# Patient Record
Sex: MALE | Race: WHITE
[De-identification: names, ages, dates, MRNs, and addresses within clinical notes are randomized per-mention and may not be internally consistent; named-entity substitution may affect disease eponyms.]

---

## 2021-02-26 ENCOUNTER — HOSPITAL ENCOUNTER (EMERGENCY)
Dept: HOSPITAL 1 - ED | Age: 41
Discharge: HOME | End: 2021-02-26
Payer: MEDICAID

## 2021-02-26 VITALS — SYSTOLIC BLOOD PRESSURE: 125 MMHG | DIASTOLIC BLOOD PRESSURE: 66 MMHG

## 2021-02-26 VITALS
BODY MASS INDEX: 24.64 KG/M2 | HEIGHT: 67 IN | BODY MASS INDEX: 24.64 KG/M2 | WEIGHT: 157 LBS | HEIGHT: 67 IN | WEIGHT: 157 LBS

## 2021-02-26 DIAGNOSIS — M54.5: Primary | ICD-10-CM

## 2021-02-26 LAB
ALBUMIN SERPL-MCNC: 4 G/DL (ref 3.4–5)
ALP SERPL-CCNC: 79 U/L (ref 46–116)
ALT SERPL-CCNC: 41 U/L (ref 16–63)
AST SERPL-CCNC: 34 U/L (ref 15–37)
BASOPHILS NFR BLD: 0.6 % (ref 0.2–1.5)
BILIRUB SERPL-MCNC: 0.42 MG/DL (ref 0.2–1)
BUN SERPL-MCNC: 12 MG/DL (ref 7–18)
CALCIUM SERPL-MCNC: 9 MG/DL (ref 8.5–10.1)
CHLORIDE SERPL-SCNC: 105 MMOL/L (ref 98–107)
CHOLEST SERPL-MCNC: 140 MG/DL (ref ?–200)
CHOLEST/HDLC SERPL: 4.2 MG/DL
CO2 SERPL-SCNC: 26.4 MMOL/L (ref 21–32)
CREAT SERPL-MCNC: 1 MG/DL (ref 0.7–1.3)
ERYTHROCYTE [DISTWIDTH] IN BLOOD BY AUTOMATED COUNT: 13.2 % (ref 12.1–16.2)
GFR SERPLBLD BASED ON 1.73 SQ M-ARVRAT: > 60 ML/MIN
GLUCOSE SERPL-MCNC: 53 MG/DL (ref 74–106)
HDLC SERPL-MCNC: 33 MG/DL (ref 40–60)
LIPASE SERPL-CCNC: 146 IU/L (ref 73–393)
MICROSCOPIC UR-IMP: NO
PLATELET # BLD: 214 X10^3MCL (ref 152–348)
POTASSIUM SERPL-SCNC: 4.1 MMOL/L (ref 3.5–5.1)
PROT SERPL-MCNC: 7.4 G/DL (ref 6.4–8.2)
RBC # UR STRIP.AUTO: NEGATIVE /UL
SODIUM SERPL-SCNC: 139 MMOL/L (ref 136–145)
TRIGL SERPL-MCNC: 165 MG/DL (ref ?–150)
UA SPECIFIC GRAVITY: 1.01 (ref 1–1.03)

## 2021-09-05 ENCOUNTER — HOSPITAL ENCOUNTER (INPATIENT)
Dept: HOSPITAL 26 - MED | Age: 41
LOS: 9 days | Discharge: LEFT BEFORE DISCHARGE | DRG: 720 | End: 2021-09-14
Attending: INTERNAL MEDICINE | Admitting: INTERNAL MEDICINE
Payer: COMMERCIAL

## 2021-09-05 VITALS — SYSTOLIC BLOOD PRESSURE: 106 MMHG | DIASTOLIC BLOOD PRESSURE: 63 MMHG

## 2021-09-05 VITALS — DIASTOLIC BLOOD PRESSURE: 67 MMHG | SYSTOLIC BLOOD PRESSURE: 127 MMHG

## 2021-09-05 VITALS — SYSTOLIC BLOOD PRESSURE: 121 MMHG | DIASTOLIC BLOOD PRESSURE: 66 MMHG

## 2021-09-05 VITALS — WEIGHT: 150 LBS | BODY MASS INDEX: 23.54 KG/M2 | HEIGHT: 67 IN

## 2021-09-05 DIAGNOSIS — R74.01: ICD-10-CM

## 2021-09-05 DIAGNOSIS — Z82.49: ICD-10-CM

## 2021-09-05 DIAGNOSIS — D68.59: ICD-10-CM

## 2021-09-05 DIAGNOSIS — U07.1: ICD-10-CM

## 2021-09-05 DIAGNOSIS — J96.01: ICD-10-CM

## 2021-09-05 DIAGNOSIS — A41.9: Primary | ICD-10-CM

## 2021-09-05 DIAGNOSIS — J12.82: ICD-10-CM

## 2021-09-05 LAB
ALBUMIN FLD-MCNC: 3.2 G/DL (ref 3.4–5)
ALBUMIN FLD-MCNC: 3.3 G/DL (ref 3.4–5)
ANION GAP SERPL CALCULATED.3IONS-SCNC: 10.5 MMOL/L (ref 8–16)
AST SERPL-CCNC: 47 U/L (ref 15–37)
AST SERPL-CCNC: 48 U/L (ref 15–37)
BASOPHILS # BLD AUTO: 0 K/UL (ref 0–0.22)
BASOPHILS NFR BLD AUTO: 0.2 % (ref 0–2)
BILIRUB DIRECT SERPL-MCNC: 0.1 MG/DL (ref 0–0.3)
BILIRUB SERPL-MCNC: 0.3 MG/DL (ref 0–1)
BILIRUB SERPL-MCNC: 0.3 MG/DL (ref 0–1)
BUN SERPL-MCNC: 6 MG/DL (ref 7–18)
CHLORIDE SERPL-SCNC: 102 MMOL/L (ref 98–107)
CO2 SERPL-SCNC: 29.1 MMOL/L (ref 21–32)
CREAT SERPL-MCNC: 1 MG/DL (ref 0.6–1.3)
DEPRECATED D DIMER PPP-ACNC: 166 NG/ML (ref 0–400)
EOSINOPHIL # BLD AUTO: 0 K/UL (ref 0–0.4)
EOSINOPHIL NFR BLD AUTO: 0.2 % (ref 0–4)
ERYTHROCYTE [DISTWIDTH] IN BLOOD BY AUTOMATED COUNT: 13.3 % (ref 11.6–13.7)
FIBRINOGEN PPP-MCNC: 438 MG/DL (ref 200–400)
GFR SERPL CREATININE-BSD FRML MDRD: 105 ML/MIN (ref 90–?)
GLUCOSE SERPL-MCNC: 124 MG/DL (ref 74–106)
HCT VFR BLD AUTO: 41 % (ref 36–52)
HGB BLD-MCNC: 13.8 G/DL (ref 12–18)
LDH SERPL-CCNC: 219 U/L (ref 85–227)
LYMPHOCYTES # BLD AUTO: 0.6 K/UL (ref 2–11.5)
LYMPHOCYTES NFR BLD AUTO: 18.8 % (ref 20.5–51.1)
MCH RBC QN AUTO: 30 PG (ref 27–31)
MCHC RBC AUTO-ENTMCNC: 34 G/DL (ref 33–37)
MCV RBC AUTO: 89.4 FL (ref 80–94)
MONOCYTES # BLD AUTO: 0.1 K/UL (ref 0.8–1)
MONOCYTES NFR BLD AUTO: 3.5 % (ref 1.7–9.3)
NEUTROPHILS # BLD AUTO: 2.6 K/UL (ref 1.8–7.7)
NEUTROPHILS NFR BLD AUTO: 77.3 % (ref 42.2–75.2)
PLATELET # BLD AUTO: 109 K/UL (ref 140–450)
POTASSIUM SERPL-SCNC: 3.6 MMOL/L (ref 3.5–5.1)
RBC # BLD AUTO: 4.59 MIL/UL (ref 4.2–6.1)
SODIUM SERPL-SCNC: 138 MMOL/L (ref 136–145)
WBC # BLD AUTO: 3.3 K/UL (ref 4.8–10.8)

## 2021-09-05 PROCEDURE — U0003 INFECTIOUS AGENT DETECTION BY NUCLEIC ACID (DNA OR RNA); SEVERE ACUTE RESPIRATORY SYNDROME CORONAVIRUS 2 (SARS-COV-2) (CORONAVIRUS DISEASE [COVID-19]), AMPLIFIED PROBE TECHNIQUE, MAKING USE OF HIGH THROUGHPUT TECHNOLOGIES AS DESCRIBED BY CMS-2020-01-R: HCPCS

## 2021-09-05 RX ADMIN — DEXTROSE SCH MLS/HR: 50 INJECTION, SOLUTION INTRAVENOUS at 19:24

## 2021-09-05 RX ADMIN — DEXAMETHASONE SODIUM PHOSPHATE SCH MG: 4 INJECTION, SOLUTION INTRAMUSCULAR; INTRAVENOUS at 18:25

## 2021-09-05 RX ADMIN — APIXABAN SCH MG: 2.5 TABLET, FILM COATED ORAL at 20:54

## 2021-09-05 NOTE — NUR
40/m bib self C/O COUGH,HA, SORE THROT,FEVER X 1.5 WEEK  AND  C/O SOB X 3 DAYS. 
COVID TESTED + 8/28/21. SEEN AT [facility] FOR COVID 19 & PNEUMONIA. O2SAT 93% AT 
THIS TIME.

PMH: DENIES. DENIES N/V/D; SKIN IS PINK/WARM/DRY; AAOX4 WITH EVEN AND STEADY 
GAIT. PATIENT STATES PAIN OF 10/10 AT THIS TIME. PATIENT POSITIONED FOR 
COMFORT; HOB ELEVATED; BEDRAILS UP X1; BED DOWN. ER MD MADE AWARE OF PT STATUS.

## 2021-09-05 NOTE — NUR
PT IN ROOM REQUESTING TO USE RESTROOM, FOUND TO HAVE CANNULA OFF. REINFORCED IMPORTANCE OF 
SUPP OXYGEN. PT AGREEABLE. WILL CONTINUE TO OBSERVE

## 2021-09-05 NOTE — NUR
RECEIVED PATIENT FROM ER NURSE VIA ALICIA. PATIENT ADMITTED FOR COVID AND PNA. PT IS AOX4, 
ABLE TO MAKE NEEDS KNOWN. RESPIRATIONS EVEN AND UNLABORED. NO S/S OF RESPIRATORY DISTRESS. 
SKIN IS WARM, DRY, AND INTACT. IV SITE ON GAL 20G SALINE LOCKED. INTACT AND PATENT. ABD 
SOFT, FLAT AND NON-DISTENDED. BOWEL SOUNDS ACTIVE IN ALL 4 QUADRANTS. DENIES PAIN AT THE 
MOMENT. PLAN OF CARE DISCUSSED. SAFETY PRECAUTIONS IN PLACE. CALL LIGHT WITHIN REACH. WILL 
CONTINUE TO MONITOR.

## 2021-09-05 NOTE — NUR
ASSISTED PT TO BATHROOM, PT HELPED BACK TO BED VOIDED X1, PT STATED HE FELT SOB VS CHECKED, 
O2 78 % ON ROOM AIR, NASAL CANNULA PLACED BACK ON PT 2L PT 84-86%. INCREASED TO 4L PT NOW 
91%. WILL CONTINUE TO OBSERVE.

## 2021-09-05 NOTE — NUR
RECEIVED REPORT FROM DAY SHIFT RN; PT AWAKE ALERT ORIENTED X3 FOLLOWING COMMANDS. PT AMB IN 
ROOM, MILD WEAKNESS NOTED. LUNGS DIMINISHED; SOB WITH EXERTION. PT FOUND TO HAVE CANNULA 
OFF, REINSTRUCTED TO PLACE NASAL CANNULA AND USE DEEP BREATHING TECHNIQUES. ABD SOFT NON 
DISTENDED. TOLERATING PO INTAKE. PT VOIDING IN RESTROOM. IV TO RUPPER ARM NOTED 20 G. BED 
LOCKED IN LOWEST POSITION. CALL LIGHT WITHIN REACH.

## 2021-09-05 NOTE — NUR
Patient will be admitted to care of Dr. Merrill. Admited to TELE.  Will go to 
room 118. Belongings list completed.  Report to Ayan

## 2021-09-06 VITALS — SYSTOLIC BLOOD PRESSURE: 135 MMHG | DIASTOLIC BLOOD PRESSURE: 67 MMHG

## 2021-09-06 VITALS — SYSTOLIC BLOOD PRESSURE: 116 MMHG | DIASTOLIC BLOOD PRESSURE: 73 MMHG

## 2021-09-06 VITALS — DIASTOLIC BLOOD PRESSURE: 74 MMHG | SYSTOLIC BLOOD PRESSURE: 134 MMHG

## 2021-09-06 VITALS — DIASTOLIC BLOOD PRESSURE: 65 MMHG | SYSTOLIC BLOOD PRESSURE: 118 MMHG

## 2021-09-06 VITALS — DIASTOLIC BLOOD PRESSURE: 56 MMHG | SYSTOLIC BLOOD PRESSURE: 159 MMHG

## 2021-09-06 VITALS — DIASTOLIC BLOOD PRESSURE: 71 MMHG | SYSTOLIC BLOOD PRESSURE: 121 MMHG

## 2021-09-06 LAB
ALBUMIN FLD-MCNC: 3.2 G/DL (ref 3.4–5)
ALBUMIN FLD-MCNC: 3.2 G/DL (ref 3.4–5)
ANION GAP SERPL CALCULATED.3IONS-SCNC: 13.3 MMOL/L (ref 8–16)
AST SERPL-CCNC: 49 U/L (ref 15–37)
AST SERPL-CCNC: 51 U/L (ref 15–37)
BASOPHILS # BLD AUTO: 0 K/UL (ref 0–0.22)
BASOPHILS NFR BLD AUTO: 0.2 % (ref 0–2)
BILIRUB DIRECT SERPL-MCNC: 0.1 MG/DL (ref 0–0.3)
BILIRUB SERPL-MCNC: 0.2 MG/DL (ref 0–1)
BILIRUB SERPL-MCNC: 0.2 MG/DL (ref 0–1)
BUN SERPL-MCNC: 13 MG/DL (ref 7–18)
CHLORIDE SERPL-SCNC: 103 MMOL/L (ref 98–107)
CO2 SERPL-SCNC: 26.7 MMOL/L (ref 21–32)
CREAT SERPL-MCNC: 0.9 MG/DL (ref 0.6–1.3)
EOSINOPHIL # BLD AUTO: 0 K/UL (ref 0–0.4)
EOSINOPHIL NFR BLD AUTO: 0 % (ref 0–4)
ERYTHROCYTE [DISTWIDTH] IN BLOOD BY AUTOMATED COUNT: 13.2 % (ref 11.6–13.7)
GFR SERPL CREATININE-BSD FRML MDRD: 119 ML/MIN (ref 90–?)
GLUCOSE SERPL-MCNC: 176 MG/DL (ref 74–106)
HCT VFR BLD AUTO: 40.7 % (ref 36–52)
HGB BLD-MCNC: 13.8 G/DL (ref 12–18)
LYMPHOCYTES # BLD AUTO: 0.4 K/UL (ref 2–11.5)
LYMPHOCYTES NFR BLD AUTO: 9.5 % (ref 20.5–51.1)
MAGNESIUM SERPL-MCNC: 2.2 MG/DL (ref 1.8–2.4)
MCH RBC QN AUTO: 30 PG (ref 27–31)
MCHC RBC AUTO-ENTMCNC: 34 G/DL (ref 33–37)
MCV RBC AUTO: 89 FL (ref 80–94)
MONOCYTES # BLD AUTO: 0.1 K/UL (ref 0.8–1)
MONOCYTES NFR BLD AUTO: 2.3 % (ref 1.7–9.3)
NEUTROPHILS # BLD AUTO: 4 K/UL (ref 1.8–7.7)
NEUTROPHILS NFR BLD AUTO: 88 % (ref 42.2–75.2)
PLATELET # BLD AUTO: 131 K/UL (ref 140–450)
POTASSIUM SERPL-SCNC: 4 MMOL/L (ref 3.5–5.1)
RBC # BLD AUTO: 4.57 MIL/UL (ref 4.2–6.1)
SODIUM SERPL-SCNC: 139 MMOL/L (ref 136–145)
WBC # BLD AUTO: 4.6 K/UL (ref 4.8–10.8)

## 2021-09-06 PROCEDURE — XW033E5 INTRODUCTION OF REMDESIVIR ANTI-INFECTIVE INTO PERIPHERAL VEIN, PERCUTANEOUS APPROACH, NEW TECHNOLOGY GROUP 5: ICD-10-PCS | Performed by: INTERNAL MEDICINE

## 2021-09-06 RX ADMIN — APIXABAN SCH MG: 2.5 TABLET, FILM COATED ORAL at 21:00

## 2021-09-06 RX ADMIN — DEXTROSE SCH MLS/HR: 50 INJECTION, SOLUTION INTRAVENOUS at 18:21

## 2021-09-06 RX ADMIN — TEMAZEPAM SCH MG: 15 CAPSULE ORAL at 22:03

## 2021-09-06 RX ADMIN — DEXAMETHASONE SODIUM PHOSPHATE SCH MG: 4 INJECTION, SOLUTION INTRAMUSCULAR; INTRAVENOUS at 17:36

## 2021-09-06 RX ADMIN — APIXABAN SCH MG: 2.5 TABLET, FILM COATED ORAL at 09:53

## 2021-09-06 NOTE — NUR
PLACED PATIENT ON O2 10L VIA OXIMIZER. O2SAT 91-92%. RT AWARE. FREQUENT ROUNDS BY ALL STAFF. 
CALL LIGHT IN REACH.  ISOLATION PRECAUTIONS OBSERVED. SAFETY PRECAUTIONS IN PLACE.

## 2021-09-06 NOTE — NUR
PT WILL BE ENDORSED TO NIGHT SHIFT NURSE FOR CONTINUITY OF CARE IN STABLE CONDITION, POC 
DISCUSSED.

## 2021-09-06 NOTE — NUR
RECEIVED REPORT FROM NIGHT SHIFT NURSE FOR CONTINUITY OF CARE, POC DISCUSSED. PT IS RESTING 
IN BED WITH NO ACUTE S/S WITH 4L O2 NC ON. PT DENIES PAIN AT THIS TIME. PT IS IS ALERT AND 
ORIENTED X4. PT CHEST RISING AND FALLING EVEN AND UNLABORED WITH NO S/S OF SOB. PT HAS A 
RIGHT AC 20G RUNNING TKO. ALL SAFETY MEASURES IN PLACE. CALL LIGHT WITHIN REACH. WILL 
CONTINUE TO MONITOR.

## 2021-09-06 NOTE — NUR
RECEIVED PATIENT FROM AM SHIFT NURSE FOR CONTINUITY OF CARE. ALERT, ABLE TO MAKE NEEDS 
KNOWN. RESPIRATIONS EVEN, SLIGHTLY LABORED UPON EXERTION. NO S/S RESPIRATORY DISTRESS. 
CONTINUES ON O2 6L VIA NC, O2 SAT 93%. S1/S2 AUSCULTATED. TELE MONITORING. DENIES PAIN. SKIN 
WARM, DRY. IV SITE TO RIGHT AC 20G PATENT/INTACT, INFUSING FLUIDS WELL. ABDOMEN SOFT, 
NONTENDER, NONDISTENDED. BOWEL SOUNDS ACTIVE x4 QUADRANTS. PATIENT IS CONTINENT OF B/B. CALL 
LIGHT IN REACH. PLAN OF CARE DISCUSSED. ISOLATION PRECAUTIONS OBSERVED. SAFETY PRECAUTIONS 
IN PLACE.

## 2021-09-06 NOTE — NUR
NAKIA MEDICATION ADMINISTERED PER MD ORDER. PT EDUCATION PROVIDED. PT IS ON 5L NC SATING AT 
94%. PT EDUCATION PROVIDED ON IMPORTANCE OF KEEPING OXYGEN ON. PT REPORTS SOB UPON 
AMBULATING. PT REPORTS ALL NEEDS ARE MET AT THIS TIME. PT SAFETY MEASURES IN PLACE, CALL 
LIGHT WITHIN REACH. WILL CONTINUE TO MONITOR.

## 2021-09-06 NOTE — NUR
REINFORCED NEED FOR SUPPLEMENTAL OXYGEN, PT FORGETS, AND TAKES OFF CANNULA.  PT AGREEABLE TO 
KEEP IT ON, WILL CONTINUE TO OBSERVE

## 2021-09-06 NOTE — NUR
NAKIA MEDICATION ADMINISTERED PER MD ORDER. PT TOLERATED ADMINISTRATION. ALL SAFETY MEASURES 
IN PLACE. CALL LIGHT WITHIN REACH. WILL CONTINUE TO MONITOR.

## 2021-09-06 NOTE — NUR
PATIENT HAS BEEN SCREENED AND CATEGORIZED AS MODERATE NUTRITION RISK. PATIENT WILL BE SEEN 
WITHIN 3-5 DAYS OF ADMISSION.



09/08/21 09/10/21



[address]TIS [address]ILLS [address]

## 2021-09-06 NOTE — NUR
SCD MEDICATION ADMINISTERED PER MD ORDER. PT TOLERATED ADMINISTRATION. ALL SAFETY MEASURES 
IN PLACE. CALL LIGHT WITHIN REACH. WILL CONTINUE TO MONITOR

## 2021-09-06 NOTE — NUR
DUE MEDS GIVEN. NO S/S RESPIRATORY DISTRESS. O2SAT 93%. DENIES PAIN. PATIENT IS CLEAN/DRY. 
CALL LIGHT IN REACH. ISOLATION PRECAUTIONS OBSERVED. SAFETY PRECAUTIONS IN PLACE.

## 2021-09-06 NOTE — NUR
PROVIDED PT WITH A BEDSIDE COMMODE TO DECREASE DISTANCE PT HAS TO AMBULATE TO USE THE 
RESTROOM. EVEN WITH O2 EXTENDER, BEDSIDE COMMODE IS MORE INDICATED TO DECREASE SOB.

## 2021-09-06 NOTE — NUR
PT HAS BEEN TRANSFERRED FROM ROOM 118 TO ROOM 130.  118 TO HOT AND UNABLE TO COOL ROOM DOWN. 
PT WAS TRANSFERRED IN A WHEELCHAIR AND PORTABLE OXYGEN TANK. PT WAS PLACED BACK ON 6L NC. PT 
REPORTED CONTINUOUS COUGHING AND SOB DURING TRANSFER BUT REMAINED STABLE. PT CONTINUED TO 
WEAR FACEMASK THROUGH TRANSFER. PT WAS PLACED IN A NEW ISOLATION ROOM. PT BACK IN BED AND 
STABLE, BACK ON 6L NC WITH IV FLUIDS RUNNING. ALL BELONGINGS TRANSFERRED WITH PATIENT. PT O2 
SATURATION BACK TO 92%. BEDSIDE COMMODE PLACED NEXT TO PT, PT PROVIDED WITH FRESH WATER AND 
WIPES. PT IS STABLE. ALL SAFETY MEASURES IN PLACE. CALL LIGHT WITHIN REACH. WILL CONTINUE TO 
MONITOR.

## 2021-09-07 VITALS — SYSTOLIC BLOOD PRESSURE: 124 MMHG | DIASTOLIC BLOOD PRESSURE: 73 MMHG

## 2021-09-07 VITALS — SYSTOLIC BLOOD PRESSURE: 121 MMHG | DIASTOLIC BLOOD PRESSURE: 53 MMHG

## 2021-09-07 VITALS — SYSTOLIC BLOOD PRESSURE: 123 MMHG | DIASTOLIC BLOOD PRESSURE: 77 MMHG

## 2021-09-07 VITALS — SYSTOLIC BLOOD PRESSURE: 131 MMHG | DIASTOLIC BLOOD PRESSURE: 65 MMHG

## 2021-09-07 VITALS — SYSTOLIC BLOOD PRESSURE: 93 MMHG | DIASTOLIC BLOOD PRESSURE: 59 MMHG

## 2021-09-07 VITALS — DIASTOLIC BLOOD PRESSURE: 69 MMHG | SYSTOLIC BLOOD PRESSURE: 145 MMHG

## 2021-09-07 LAB
ALBUMIN FLD-MCNC: 3.1 G/DL (ref 3.4–5)
AST SERPL-CCNC: 43 U/L (ref 15–37)
BILIRUB DIRECT SERPL-MCNC: 0.1 MG/DL (ref 0–0.3)
BILIRUB SERPL-MCNC: 0.2 MG/DL (ref 0–1)

## 2021-09-07 PROCEDURE — 5A0935A ASSISTANCE WITH RESPIRATORY VENTILATION, LESS THAN 24 CONSECUTIVE HOURS, HIGH NASAL FLOW/VELOCITY: ICD-10-PCS | Performed by: INTERNAL MEDICINE

## 2021-09-07 RX ADMIN — SODIUM CHLORIDE SCH MLS/HR: 9 INJECTION, SOLUTION INTRAVENOUS at 12:52

## 2021-09-07 RX ADMIN — APIXABAN SCH MG: 2.5 TABLET, FILM COATED ORAL at 08:52

## 2021-09-07 RX ADMIN — APIXABAN SCH MG: 2.5 TABLET, FILM COATED ORAL at 20:33

## 2021-09-07 RX ADMIN — TEMAZEPAM SCH MG: 15 CAPSULE ORAL at 22:17

## 2021-09-07 RX ADMIN — DEXAMETHASONE SODIUM PHOSPHATE SCH MG: 4 INJECTION, SOLUTION INTRAMUSCULAR; INTRAVENOUS at 16:53

## 2021-09-07 NOTE — NUR
WITH COUGHING EPISODE AND DESATURATION TO 83% ON HIFLOW 30L FIO2 80%. HOB ELEVATED  TO HIGH 
FOWLERS. INSTRUCTED PT TO RELAX AND BREATH THROUGH HIS NOSE. CALLED RT, INCREASED FLOW TO 
35L AND FIO2 %.

## 2021-09-07 NOTE — NUR
MADE ROUNDS. PATIENT IS ASLEEP. NO S/S RESPIRATORY DISTRESS. PATIENT IS CLEAN/DRY. CALL 
LIGHT IN REACH.  ISOLATION PRECAUTIONS OBSERVED. SAFETY PRECAUTIONS IN PLACE.

## 2021-09-07 NOTE — NUR
PLACED ON A VAPOTHERM HIGH FLOW NASAL CANNULA AS NOTED PLUGGED INTO RED OUTLET TOLERATING 
WELL WITHOUT COMPLICATIONS NOTED MARILU/RN NOTIFIED WITH EDUCATION

## 2021-09-07 NOTE — NUR
MARCO FROM LAB CALLED; PCR CAME BACK POSITIVE. NOT REPORTED TO MD DUE TO PT RAPID POSITIVE 
AND ALL COVID PRECAUTIONS AND PROTOCOL IS IN PLACE.

## 2021-09-07 NOTE — NUR
DC PLANNIN YRS OLD MALE PATIENT WAS ADMITTED FROM HOME WITH A DX OF COVID PNEUMONIA. PATIENT HAS NO 
MEDICAL  HX.CXR SHOWED PATCHY CONSOLIDATIONS CONCERNING FOR COVID PNEUMONIA. RAPID AND PCR 
COVID TEST POSITIVE. ON 15L NRB SATING 90% . ADMINISTERED REMDESIVIR IV AZITHROMYCIN AND 
ROCEPHIN IV ABX 

-------------------------------------------------------------------------------

Addendum: 21 at 1650 by Judy Mccrary RN

-------------------------------------------------------------------------------

DC PLANNING 

PATIENT IS STILL ON 15LHF, SATING 94%. PULMO AND ID FOLLOWING. DC PLAN TO WEAN OF OXYGEN. CM 
TO FOLLOW

## 2021-09-07 NOTE — NUR
PATIENT IS ASLEEP. NO S/S RESPIRATORY DISTRESS. PATIENT IS CLEAN/DRY. CALL LIGHT IN REACH. 
SAFETY PRECAUTIONS IN PLACE. ISOLATION PRECAUTIONS OBSERVED BY ALL STAFF.

## 2021-09-07 NOTE — NUR
ALL NEEDS ANTICIPATED AND MET. NO S/S RESPIRATORY DISTRESS. NO C/O PAIN. PATIENT IS 
CLEAN/DRY. CALL LIGHT IN REACH. SAFETY PRECAUTIONS IN PLACE. ISOLATION PRECAUTIONS OBSERVED 
BY ALL STAFF.

## 2021-09-07 NOTE — NUR
PT ENDORSED BY NIGHT SHIFT FOR CONTINUITY OF CARE, POC DISCUSSED. PT IS RESTING IN BED. 
EDUCATED ON PRONE POSITION, PT STATES HE FEELS WORSE LIKE THAT. EDUCATED ON IMPORTANCE OF 
THE POSITION, PT STATED HE WILL TRY AFTER BREAKFAST. PT IS ON OXIMETER AT 10L. SPOKE WITH 
RAY NAIK, WILL PLACE PT ON HIGH FLOW. PT IS CURRENTLY SATING AT 90%. PT PLACED ON CONTINUOUS 
O2 MONITOR AND TELE MONITOR. ALL SAFETY MEASURES IN PLACE, CALL LIGHT WITHIN REACH. WILL 
CONTINUE TO MONITOR.

## 2021-09-07 NOTE — NUR
NAKIA MEDICATION ADMINISTERED, BREAKFAST PLACED AT BEDSIDE. PT REPORTS HIGH FLOW MAKING HIM 
LESS SHORT OF BREATH. ALL SAFETY MEASURES IN PLACE, CALL LIGHT WITHIN REACH. WILL CONTINUE 
TO MONITOR.

## 2021-09-07 NOTE — NUR
ASLEEP EASILY AWAKENS GOOD CHEST RISE AIRWAY PATENT NO DISTRESS NOTED BREATH SOUNDS 
DECREASED BILATERAL SINCE 09/05/21 DESCENDING SATURATION TO 89% WITH INCREASED OXYGEN USE TO 
11 LPM VIA OXYMIZER PATIENT CANDIDATE FOR HIGH FLOW NASAL CANNULA

## 2021-09-08 VITALS — SYSTOLIC BLOOD PRESSURE: 113 MMHG | DIASTOLIC BLOOD PRESSURE: 61 MMHG

## 2021-09-08 VITALS — SYSTOLIC BLOOD PRESSURE: 112 MMHG | DIASTOLIC BLOOD PRESSURE: 65 MMHG

## 2021-09-08 VITALS — DIASTOLIC BLOOD PRESSURE: 55 MMHG | SYSTOLIC BLOOD PRESSURE: 130 MMHG

## 2021-09-08 VITALS — DIASTOLIC BLOOD PRESSURE: 72 MMHG | SYSTOLIC BLOOD PRESSURE: 105 MMHG

## 2021-09-08 VITALS — SYSTOLIC BLOOD PRESSURE: 119 MMHG | DIASTOLIC BLOOD PRESSURE: 64 MMHG

## 2021-09-08 VITALS — DIASTOLIC BLOOD PRESSURE: 66 MMHG | SYSTOLIC BLOOD PRESSURE: 121 MMHG

## 2021-09-08 LAB
ALBUMIN FLD-MCNC: 3 G/DL (ref 3.4–5)
ANION GAP SERPL CALCULATED.3IONS-SCNC: 12.7 MMOL/L (ref 8–16)
AST SERPL-CCNC: 146 U/L (ref 15–37)
BILIRUB DIRECT SERPL-MCNC: 0.1 MG/DL (ref 0–0.3)
BILIRUB SERPL-MCNC: 0.3 MG/DL (ref 0–1)
BUN SERPL-MCNC: 20 MG/DL (ref 7–18)
CHLORIDE SERPL-SCNC: 105 MMOL/L (ref 98–107)
CO2 SERPL-SCNC: 26.5 MMOL/L (ref 21–32)
CREAT SERPL-MCNC: 0.7 MG/DL (ref 0.6–1.3)
GFR SERPL CREATININE-BSD FRML MDRD: 159 ML/MIN (ref 90–?)
GLUCOSE SERPL-MCNC: 117 MG/DL (ref 74–106)
POTASSIUM SERPL-SCNC: 4.2 MMOL/L (ref 3.5–5.1)
SODIUM SERPL-SCNC: 140 MMOL/L (ref 136–145)

## 2021-09-08 PROCEDURE — 5A0935A ASSISTANCE WITH RESPIRATORY VENTILATION, LESS THAN 24 CONSECUTIVE HOURS, HIGH NASAL FLOW/VELOCITY: ICD-10-PCS | Performed by: INTERNAL MEDICINE

## 2021-09-08 RX ADMIN — APIXABAN SCH MG: 2.5 TABLET, FILM COATED ORAL at 21:38

## 2021-09-08 RX ADMIN — SODIUM CHLORIDE SCH MLS/HR: 9 INJECTION, SOLUTION INTRAVENOUS at 13:00

## 2021-09-08 RX ADMIN — APIXABAN SCH MG: 2.5 TABLET, FILM COATED ORAL at 09:00

## 2021-09-08 RX ADMIN — DEXAMETHASONE SODIUM PHOSPHATE SCH MG: 4 INJECTION, SOLUTION INTRAMUSCULAR; INTRAVENOUS at 16:45

## 2021-09-08 RX ADMIN — TEMAZEPAM SCH MG: 15 CAPSULE ORAL at 21:39

## 2021-09-08 NOTE — NUR
PT REMAINED STABLE THROUGH SHIFT. ONLY COMPLAINTS WERE ANXIETY, GAVE PT PRN ATIVAN TO HELP 
CONTROL IT. IT HELPS DECREASED ANXIETY.REMDESEVIR WAS GIVEN. ALL NEEDS MET FOR TODAY. GAVE 
REPORT TO PM RN .

## 2021-09-08 NOTE — NUR
RECEIVED REPORT FROM PM RN. PT RESTING IN BED ASLEEP, NO SIGNS OF RESP DISTRESS NOTED. 
PATIENT REMAINS ON 35L HIGH NAREN. LOOKS COMFORTABLE. IV WNL, RUNNING AT KVO. BED AT LOWEST, 
CALL LIGHT AT REACH.

## 2021-09-08 NOTE — NUR
DESATURATION TO 82% PATIENT TOOK OFF HIS HI-FLOW, EDUCATION TO KEEP OXYGEN IN PLACE. BRENT BUSTAMANTE RN

## 2021-09-09 VITALS — SYSTOLIC BLOOD PRESSURE: 108 MMHG | DIASTOLIC BLOOD PRESSURE: 54 MMHG

## 2021-09-09 VITALS — DIASTOLIC BLOOD PRESSURE: 61 MMHG | SYSTOLIC BLOOD PRESSURE: 106 MMHG

## 2021-09-09 VITALS — DIASTOLIC BLOOD PRESSURE: 65 MMHG | SYSTOLIC BLOOD PRESSURE: 117 MMHG

## 2021-09-09 VITALS — SYSTOLIC BLOOD PRESSURE: 105 MMHG | DIASTOLIC BLOOD PRESSURE: 70 MMHG

## 2021-09-09 VITALS — DIASTOLIC BLOOD PRESSURE: 65 MMHG | SYSTOLIC BLOOD PRESSURE: 112 MMHG

## 2021-09-09 VITALS — DIASTOLIC BLOOD PRESSURE: 69 MMHG | SYSTOLIC BLOOD PRESSURE: 124 MMHG

## 2021-09-09 LAB
ALBUMIN FLD-MCNC: 3.2 G/DL (ref 3.4–5)
ANION GAP SERPL CALCULATED.3IONS-SCNC: 14.7 MMOL/L (ref 8–16)
AST SERPL-CCNC: 65 U/L (ref 15–37)
BASOPHILS # BLD AUTO: 0.1 K/UL (ref 0–0.22)
BASOPHILS NFR BLD AUTO: 0.8 % (ref 0–2)
BILIRUB SERPL-MCNC: 0.4 MG/DL (ref 0–1)
BUN SERPL-MCNC: 19 MG/DL (ref 7–18)
CHLORIDE SERPL-SCNC: 103 MMOL/L (ref 98–107)
CO2 SERPL-SCNC: 27.3 MMOL/L (ref 21–32)
CREAT SERPL-MCNC: 0.8 MG/DL (ref 0.6–1.3)
EOSINOPHIL # BLD AUTO: 0 K/UL (ref 0–0.4)
EOSINOPHIL NFR BLD AUTO: 0.1 % (ref 0–4)
ERYTHROCYTE [DISTWIDTH] IN BLOOD BY AUTOMATED COUNT: 13.5 % (ref 11.6–13.7)
GFR SERPL CREATININE-BSD FRML MDRD: 136 ML/MIN (ref 90–?)
GLUCOSE SERPL-MCNC: 109 MG/DL (ref 74–106)
HCT VFR BLD AUTO: 45.3 % (ref 36–52)
HGB BLD-MCNC: 15.4 G/DL (ref 12–18)
LYMPHOCYTES # BLD AUTO: 1.3 K/UL (ref 2–11.5)
LYMPHOCYTES NFR BLD AUTO: 12.6 % (ref 20.5–51.1)
MCH RBC QN AUTO: 30 PG (ref 27–31)
MCHC RBC AUTO-ENTMCNC: 34 G/DL (ref 33–37)
MCV RBC AUTO: 88.3 FL (ref 80–94)
MONOCYTES # BLD AUTO: 0.7 K/UL (ref 0.8–1)
MONOCYTES NFR BLD AUTO: 6.7 % (ref 1.7–9.3)
NEUTROPHILS # BLD AUTO: 8.1 K/UL (ref 1.8–7.7)
NEUTROPHILS NFR BLD AUTO: 79.8 % (ref 42.2–75.2)
PLATELET # BLD AUTO: 296 K/UL (ref 140–450)
POTASSIUM SERPL-SCNC: 4 MMOL/L (ref 3.5–5.1)
RBC # BLD AUTO: 5.13 MIL/UL (ref 4.2–6.1)
SODIUM SERPL-SCNC: 141 MMOL/L (ref 136–145)
WBC # BLD AUTO: 10.2 K/UL (ref 4.8–10.8)

## 2021-09-09 PROCEDURE — 5A0935A ASSISTANCE WITH RESPIRATORY VENTILATION, LESS THAN 24 CONSECUTIVE HOURS, HIGH NASAL FLOW/VELOCITY: ICD-10-PCS | Performed by: INTERNAL MEDICINE

## 2021-09-09 RX ADMIN — APIXABAN SCH MG: 2.5 TABLET, FILM COATED ORAL at 20:37

## 2021-09-09 RX ADMIN — APIXABAN SCH MG: 2.5 TABLET, FILM COATED ORAL at 13:20

## 2021-09-09 RX ADMIN — TEMAZEPAM SCH MG: 15 CAPSULE ORAL at 20:40

## 2021-09-09 RX ADMIN — DEXAMETHASONE SODIUM PHOSPHATE SCH MG: 4 INJECTION, SOLUTION INTRAMUSCULAR; INTRAVENOUS at 17:12

## 2021-09-09 RX ADMIN — SODIUM CHLORIDE SCH MLS/HR: 9 INJECTION, SOLUTION INTRAVENOUS at 12:59

## 2021-09-09 NOTE — NUR
PATIENT IS EXPERIENCING ANXIETY WITH THINKING ABOUT HIS SITUATION AT THIS TIME. REQUEST FOR 
ATIVAN GIVEN. BRENT BUSTAMANTE RN

## 2021-09-09 NOTE — NUR
Pt c/o feeling anxious about his condition and he is worried about his family at home. 
Active listening, and reassurance provided. Lorazepam administered IVP. Educated patient on 
med side effects of dizziness and drowsiness. Patient verbalized understanding.

## 2021-09-09 NOTE — NUR
Pt resting in bed, talking on the phone with family, respirations even & nonlabored on O2 @ 
35L/min via hiflow n/c. Call light within reach.

## 2021-09-09 NOTE — NUR
9/9/21 RD INITIAL ASSESSMENT COMPLETED



PLEASE REFER TO NUTRITION ASSESSMENT UNDER CARE ACTIVITY FOR ESTIMATED NUTRITIONAL NEEDS. 



1. CONTINUE REGULAR DIET AS TOLERATED 

2. RECOMMEND ENSURE BID, VITAMIN C 500 MG DAILY, VITAMIN D, AND MULTIVITAMIN ONCE DAILY

3. RD PROVIDED NUTRITION EDUCATION ON COVID-19

4. RD TO FOLLOW-UP 3-5 DAYS, MODERATE RISK 



[address]IS [address]

## 2021-09-09 NOTE — NUR
OXYGEN SATURATION WITHOUT HI FLOW IS 68% ON ROOM AIR AFTER USING BEDSIDE COMMODE 
INDEPENDENTLY AND GETTING BACK TO BED. PATIENT REMOVED OWN IV WHILE GOING TO BEDSIDE 
COMMODE. BRENT BUSTAMANTE RN

## 2021-09-09 NOTE — NUR
Received report from pm nurse Luiz. Pt resting in bed, awake, respirations even & nonlabored 
on 35L/min hiflow n/c. RT at bedside providing care. Call light within reach.

## 2021-09-09 NOTE — NUR
INCONTINENCE EPISODE PERIANAL CARE AT THIS TIME. PARTIAL LINEN CHANGE, GOWN CHANGE, NEW 
UNDERGARMENT. BRENT BUSTAMANTE RN

## 2021-09-10 VITALS — DIASTOLIC BLOOD PRESSURE: 75 MMHG | SYSTOLIC BLOOD PRESSURE: 135 MMHG

## 2021-09-10 VITALS — SYSTOLIC BLOOD PRESSURE: 101 MMHG | DIASTOLIC BLOOD PRESSURE: 59 MMHG

## 2021-09-10 VITALS — SYSTOLIC BLOOD PRESSURE: 100 MMHG | DIASTOLIC BLOOD PRESSURE: 68 MMHG

## 2021-09-10 VITALS — SYSTOLIC BLOOD PRESSURE: 104 MMHG | DIASTOLIC BLOOD PRESSURE: 64 MMHG

## 2021-09-10 VITALS — SYSTOLIC BLOOD PRESSURE: 109 MMHG | DIASTOLIC BLOOD PRESSURE: 64 MMHG

## 2021-09-10 VITALS — DIASTOLIC BLOOD PRESSURE: 67 MMHG | SYSTOLIC BLOOD PRESSURE: 109 MMHG

## 2021-09-10 LAB
ALBUMIN FLD-MCNC: 3.1 G/DL (ref 3.4–5)
ANION GAP SERPL CALCULATED.3IONS-SCNC: 17.5 MMOL/L (ref 8–16)
AST SERPL-CCNC: 67 U/L (ref 15–37)
BASOPHILS # BLD AUTO: 0 K/UL (ref 0–0.22)
BASOPHILS NFR BLD AUTO: 0 % (ref 0–2)
BILIRUB SERPL-MCNC: 0.4 MG/DL (ref 0–1)
BUN SERPL-MCNC: 19 MG/DL (ref 7–18)
CHLORIDE SERPL-SCNC: 106 MMOL/L (ref 98–107)
CO2 SERPL-SCNC: 22.8 MMOL/L (ref 21–32)
CREAT SERPL-MCNC: 0.7 MG/DL (ref 0.6–1.3)
EOSINOPHIL # BLD AUTO: 0 K/UL (ref 0–0.4)
EOSINOPHIL NFR BLD AUTO: 0 % (ref 0–4)
ERYTHROCYTE [DISTWIDTH] IN BLOOD BY AUTOMATED COUNT: 13.3 % (ref 11.6–13.7)
GFR SERPL CREATININE-BSD FRML MDRD: 159 ML/MIN (ref 90–?)
GLUCOSE SERPL-MCNC: 105 MG/DL (ref 74–106)
HCT VFR BLD AUTO: 44 % (ref 36–52)
HGB BLD-MCNC: 14.9 G/DL (ref 12–18)
LYMPHOCYTES # BLD AUTO: 1 K/UL (ref 2–11.5)
LYMPHOCYTES NFR BLD AUTO: 10.5 % (ref 20.5–51.1)
MCH RBC QN AUTO: 30 PG (ref 27–31)
MCHC RBC AUTO-ENTMCNC: 34 G/DL (ref 33–37)
MCV RBC AUTO: 88.6 FL (ref 80–94)
MONOCYTES # BLD AUTO: 0.7 K/UL (ref 0.8–1)
MONOCYTES NFR BLD AUTO: 6.9 % (ref 1.7–9.3)
NEUTROPHILS # BLD AUTO: 7.9 K/UL (ref 1.8–7.7)
NEUTROPHILS NFR BLD AUTO: 82.6 % (ref 42.2–75.2)
PLATELET # BLD AUTO: 316 K/UL (ref 140–450)
POTASSIUM SERPL-SCNC: 4.3 MMOL/L (ref 3.5–5.1)
RBC # BLD AUTO: 4.97 MIL/UL (ref 4.2–6.1)
SODIUM SERPL-SCNC: 142 MMOL/L (ref 136–145)
WBC # BLD AUTO: 9.5 K/UL (ref 4.8–10.8)

## 2021-09-10 PROCEDURE — 5A0935A ASSISTANCE WITH RESPIRATORY VENTILATION, LESS THAN 24 CONSECUTIVE HOURS, HIGH NASAL FLOW/VELOCITY: ICD-10-PCS | Performed by: INTERNAL MEDICINE

## 2021-09-10 RX ADMIN — APIXABAN SCH MG: 2.5 TABLET, FILM COATED ORAL at 21:19

## 2021-09-10 RX ADMIN — APIXABAN SCH MG: 2.5 TABLET, FILM COATED ORAL at 08:40

## 2021-09-10 RX ADMIN — TEMAZEPAM SCH MG: 15 CAPSULE ORAL at 21:17

## 2021-09-10 RX ADMIN — OXYCODONE HYDROCHLORIDE AND ACETAMINOPHEN SCH MG: 500 TABLET ORAL at 08:41

## 2021-09-10 RX ADMIN — MULTIVITAMIN TABLET SCH TAB: TABLET at 08:41

## 2021-09-10 RX ADMIN — DEXAMETHASONE SODIUM PHOSPHATE SCH MG: 4 INJECTION, SOLUTION INTRAMUSCULAR; INTRAVENOUS at 18:07

## 2021-09-10 RX ADMIN — Medication SCH IU: at 08:41

## 2021-09-10 RX ADMIN — SODIUM CHLORIDE SCH MLS/HR: 9 INJECTION, SOLUTION INTRAVENOUS at 12:46

## 2021-09-10 NOTE — NUR
O2 SATURATION WAS AT 99%. TITRATED HI FLOW DOWN TO 30 L/MIN 80% FIO2. PT O2 SATURATION AT 
94%. NO DISTRESS NOTED. WILL CONTINUE TO MONITOR.

## 2021-09-10 NOTE — NUR
RECEIVED PATIENT FROM NIGHT SHIFT NURSE FOR CONTINUITY OF CARE. PT IS AOX4, ABLE TO MAKE 
NEEDS KNOWN. RESPIRATIONS EVEN AND UNLABORED. ON HI-FLOW WITH NO S/S OF RESPIRATORY 
DISTRESS. SKIN IS WARM, DRY, AND INTACT. IV SITE ON LFA 22G SALINE LOCKED. INTACT AND 
PATENT. DENIES PAIN AT THE MOMENT. PLAN OF CARE DISCUSSED. SAFETY PRECAUTIONS IN PLACE. CALL 
LIGHT WITHIN REACH. WILL CONTINUE TO MONITOR.

## 2021-09-11 VITALS — DIASTOLIC BLOOD PRESSURE: 60 MMHG | SYSTOLIC BLOOD PRESSURE: 104 MMHG

## 2021-09-11 VITALS — DIASTOLIC BLOOD PRESSURE: 61 MMHG | SYSTOLIC BLOOD PRESSURE: 100 MMHG

## 2021-09-11 VITALS — SYSTOLIC BLOOD PRESSURE: 104 MMHG | DIASTOLIC BLOOD PRESSURE: 68 MMHG

## 2021-09-11 VITALS — DIASTOLIC BLOOD PRESSURE: 68 MMHG | SYSTOLIC BLOOD PRESSURE: 111 MMHG

## 2021-09-11 VITALS — DIASTOLIC BLOOD PRESSURE: 62 MMHG | SYSTOLIC BLOOD PRESSURE: 105 MMHG

## 2021-09-11 VITALS — DIASTOLIC BLOOD PRESSURE: 56 MMHG | SYSTOLIC BLOOD PRESSURE: 96 MMHG

## 2021-09-11 LAB
BASOPHILS # BLD AUTO: 0 K/UL (ref 0–0.22)
BASOPHILS NFR BLD AUTO: 0.1 % (ref 0–2)
EOSINOPHIL # BLD AUTO: 0 K/UL (ref 0–0.4)
EOSINOPHIL NFR BLD AUTO: 0.1 % (ref 0–4)
ERYTHROCYTE [DISTWIDTH] IN BLOOD BY AUTOMATED COUNT: 12.9 % (ref 11.6–13.7)
HCT VFR BLD AUTO: 42.6 % (ref 36–52)
HGB BLD-MCNC: 14.6 G/DL (ref 12–18)
LYMPHOCYTES # BLD AUTO: 0.8 K/UL (ref 2–11.5)
LYMPHOCYTES NFR BLD AUTO: 7.6 % (ref 20.5–51.1)
MCH RBC QN AUTO: 30 PG (ref 27–31)
MCHC RBC AUTO-ENTMCNC: 34 G/DL (ref 33–37)
MCV RBC AUTO: 87.1 FL (ref 80–94)
MONOCYTES # BLD AUTO: 0.6 K/UL (ref 0.8–1)
MONOCYTES NFR BLD AUTO: 5.8 % (ref 1.7–9.3)
NEUTROPHILS # BLD AUTO: 9.1 K/UL (ref 1.8–7.7)
NEUTROPHILS NFR BLD AUTO: 86.4 % (ref 42.2–75.2)
PLATELET # BLD AUTO: 331 K/UL (ref 140–450)
RBC # BLD AUTO: 4.89 MIL/UL (ref 4.2–6.1)
WBC # BLD AUTO: 10.5 K/UL (ref 4.8–10.8)

## 2021-09-11 RX ADMIN — TEMAZEPAM SCH MG: 15 CAPSULE ORAL at 21:44

## 2021-09-11 RX ADMIN — APIXABAN SCH MG: 2.5 TABLET, FILM COATED ORAL at 21:50

## 2021-09-11 RX ADMIN — DEXAMETHASONE SODIUM PHOSPHATE SCH MG: 4 INJECTION, SOLUTION INTRAMUSCULAR; INTRAVENOUS at 16:59

## 2021-09-11 RX ADMIN — MULTIVITAMIN TABLET SCH TAB: TABLET at 09:38

## 2021-09-11 RX ADMIN — OXYCODONE HYDROCHLORIDE AND ACETAMINOPHEN SCH MG: 500 TABLET ORAL at 09:38

## 2021-09-11 RX ADMIN — APIXABAN SCH MG: 2.5 TABLET, FILM COATED ORAL at 09:40

## 2021-09-11 RX ADMIN — Medication SCH IU: at 09:38

## 2021-09-11 NOTE — NUR
PT IS RESTING IN BED WITH NO S/S OF ACUTE DISTRESS. PT ON HIGH FLOW SATING AT 91%. PT ON 
TELE MONITOR SHOWING SINUS RHYTHM

## 2021-09-11 NOTE — NUR
RECEIVED REPORT GRETEL BURGOS FOR CONTINUITY OF CARE, POC DISCUSSED. PT IS ALERT AND ORIENTED X4. 
PT IS RESTING IN BED WITH CHEST RISING AND FALLING WITH NO S/S OF SOB. PT IS ON 85% FIO2 30 
HIGH FLOW NC. PT IS SATING AT 90%. PT SKIN INTACT, WITH A LEFT FA 22G SALINE LOCK. PT IS ON 
ALL COVID PRECAUTIONS. PT ON TELE MONITOR SHOWING 87. ALL SAFETY MEASURES IN PLACE, CALL 
LIGHT WITHIN REACH. WILL CONTINUE TO MONITOR.

## 2021-09-11 NOTE — NUR
Assumed care. A/O. Able to verbalize needs. He claims to be frustrated. He claims that 
nothing has been or is being done for him. We have reoriented him to the to all the medical 
treatments he has received so far, to all the care that has been provided so far. Our goal 
has been to make him understand the we do care about him. We have reassured him that our 
goal is to get him well. He seems relaxed now, after this lengthy conversation. He claims to 
have insomnia presently. We shall contact the attending physician to get him a sleep aide. 
He seems to be on the edge after night of minimal or no sleep at all. He has been educated 
on the importance of the incentive spirometry, and how, and when and how often to use it. He 
is able to go up to 500, and he verbalized understanding. Will contact the attending 
physician  for the sleep aide.

## 2021-09-11 NOTE — NUR
NAKIA MEDICATION ADMINISTERED PER MD ORDER. PT TOLERATED ADMINISTRATION. PT IS STABLE WITH 
CHEST RISING AND FALLING SATING AT 90%. ALL SAFETY MEASURES IN PLACE, CALL LIGHT WITHIN 
REACH WILL CONTINUE TO MONITOR.

## 2021-09-11 NOTE — NUR
PT IS RESTING COMFORTABLE IN BES WITH NO ACUTE S/S OF DISTRESS. ALL SAFETY MEASURES IN 
PLACE. CALL LIGHT WITHIN REACH. WILL CONTINUE TO MONITOR.

## 2021-09-11 NOTE — NUR
NAKIA MEDICATION ADMINISTERED PER MD ORDER. PT TOLERATED ADMINISTRATION. PT IS RESTING IN BED 
WITH CHEST RISING AND FALLING EVEN AND UNLABORED SATING AT 90%. ALL SAFETY MEASURES IN 
PLACE. CALL LIGHT WITHIN REACH. WILL CONTINUE TO MONITOR.

## 2021-09-11 NOTE — NUR
ROUNDED ON PT, PT IS RESTING IN BED WITH NO ACUTE S/S OF DISTRESS. PT IS SATING AT 89% WITH 
CHEST RISING AND FALLING EVEN AND UNLABORED. ALL SAFETY MEASURES IN PLACE, CALL LIGHT WITHIN 
REACH. WILL CONTINUE TO MONITOR.

## 2021-09-12 VITALS — DIASTOLIC BLOOD PRESSURE: 67 MMHG | SYSTOLIC BLOOD PRESSURE: 107 MMHG

## 2021-09-12 VITALS — DIASTOLIC BLOOD PRESSURE: 58 MMHG | SYSTOLIC BLOOD PRESSURE: 115 MMHG

## 2021-09-12 VITALS — SYSTOLIC BLOOD PRESSURE: 105 MMHG | DIASTOLIC BLOOD PRESSURE: 65 MMHG

## 2021-09-12 VITALS — DIASTOLIC BLOOD PRESSURE: 72 MMHG | SYSTOLIC BLOOD PRESSURE: 111 MMHG

## 2021-09-12 VITALS — SYSTOLIC BLOOD PRESSURE: 127 MMHG | DIASTOLIC BLOOD PRESSURE: 63 MMHG

## 2021-09-12 VITALS — DIASTOLIC BLOOD PRESSURE: 60 MMHG | SYSTOLIC BLOOD PRESSURE: 111 MMHG

## 2021-09-12 LAB
ALBUMIN FLD-MCNC: 2.9 G/DL (ref 3.4–5)
ANION GAP SERPL CALCULATED.3IONS-SCNC: 10.1 MMOL/L (ref 8–16)
AST SERPL-CCNC: 16 U/L (ref 15–37)
BASOPHILS # BLD AUTO: 0 K/UL (ref 0–0.22)
BASOPHILS NFR BLD AUTO: 0.2 % (ref 0–2)
BILIRUB SERPL-MCNC: 0.3 MG/DL (ref 0–1)
BUN SERPL-MCNC: 14 MG/DL (ref 7–18)
CHLORIDE SERPL-SCNC: 106 MMOL/L (ref 98–107)
CO2 SERPL-SCNC: 26.9 MMOL/L (ref 21–32)
CREAT SERPL-MCNC: 0.7 MG/DL (ref 0.6–1.3)
EOSINOPHIL # BLD AUTO: 0.2 K/UL (ref 0–0.4)
EOSINOPHIL NFR BLD AUTO: 1 % (ref 0–4)
ERYTHROCYTE [DISTWIDTH] IN BLOOD BY AUTOMATED COUNT: 13 % (ref 11.6–13.7)
GFR SERPL CREATININE-BSD FRML MDRD: 159 ML/MIN (ref 90–?)
GLUCOSE SERPL-MCNC: 109 MG/DL (ref 74–106)
HCT VFR BLD AUTO: 41.5 % (ref 36–52)
HGB BLD-MCNC: 14 G/DL (ref 12–18)
LYMPHOCYTES # BLD AUTO: 1.7 K/UL (ref 2–11.5)
LYMPHOCYTES NFR BLD AUTO: 10.7 % (ref 20.5–51.1)
MCH RBC QN AUTO: 30 PG (ref 27–31)
MCHC RBC AUTO-ENTMCNC: 34 G/DL (ref 33–37)
MCV RBC AUTO: 89 FL (ref 80–94)
MONOCYTES # BLD AUTO: 0.8 K/UL (ref 0.8–1)
MONOCYTES NFR BLD AUTO: 5 % (ref 1.7–9.3)
NEUTROPHILS # BLD AUTO: 13.6 K/UL (ref 1.8–7.7)
NEUTROPHILS NFR BLD AUTO: 83.1 % (ref 42.2–75.2)
PLATELET # BLD AUTO: 404 K/UL (ref 140–450)
POTASSIUM SERPL-SCNC: 4 MMOL/L (ref 3.5–5.1)
RBC # BLD AUTO: 4.66 MIL/UL (ref 4.2–6.1)
SODIUM SERPL-SCNC: 139 MMOL/L (ref 136–145)
WBC # BLD AUTO: 16.3 K/UL (ref 4.8–10.8)

## 2021-09-12 PROCEDURE — 5A0935A ASSISTANCE WITH RESPIRATORY VENTILATION, LESS THAN 24 CONSECUTIVE HOURS, HIGH NASAL FLOW/VELOCITY: ICD-10-PCS | Performed by: INTERNAL MEDICINE

## 2021-09-12 RX ADMIN — DEXAMETHASONE SODIUM PHOSPHATE SCH MG: 4 INJECTION, SOLUTION INTRAMUSCULAR; INTRAVENOUS at 18:13

## 2021-09-12 RX ADMIN — APIXABAN SCH MG: 2.5 TABLET, FILM COATED ORAL at 20:33

## 2021-09-12 RX ADMIN — TEMAZEPAM SCH MG: 15 CAPSULE ORAL at 22:18

## 2021-09-12 RX ADMIN — Medication SCH IU: at 08:37

## 2021-09-12 RX ADMIN — APIXABAN SCH MG: 2.5 TABLET, FILM COATED ORAL at 08:38

## 2021-09-12 RX ADMIN — OXYCODONE HYDROCHLORIDE AND ACETAMINOPHEN SCH MG: 500 TABLET ORAL at 08:37

## 2021-09-12 RX ADMIN — MULTIVITAMIN TABLET SCH TAB: TABLET at 08:36

## 2021-09-12 NOTE — NUR
PT IS STABLE IN BED WITH NO ACUTE S/S OF DISTRESS. CHEST RISING AND FALLING EVEN AND 
UNLABORED SATING AT 91%. ALL SAFETY MEASURES IN PLACE, CALL LIGHT WITHIN REACH. WILL 
CONTINUE TO MONITOR.

## 2021-09-12 NOTE — NUR
NAKIA MEDICATION ADMINISTERED PER MD ORDER, PT TOLERATED ADMINISTRATION, EDUCATION PROVIDED, 
PT VERBALIZED UNDERSTANDING. PT IS SITTING IN BED EATING BREAKFAST SATING AT 89%. PT 
PROVIDED WITH BATHING MATERIAL FOR A BEDSIDE BATH, AND ORAL CARE. PROVIDED PT WITH CLEAN 
SHEETS AND GOWN. PT REPORTS ALL NEEDS MET AT THIS TIME. ALL COVID PRECAUTIONS IN PLACE, ALL 
SAFETY MEASURES IN PLACE. CALL LIGHT WITHIN REACH. WILL CONTINUE TO MONITOR.

## 2021-09-12 NOTE — NUR
WINDOW OPENED FOR FAMILY MEMBER. PT IS STABLE AND SATING AT 91%. ALL SAFETY MEASURES IN 
PLACE. CALL LIGHT WITHIN REACH. WILL CONTINUE TO MONITOR.

## 2021-09-12 NOTE — NUR
PT BEEN ENDORSED BY NIGHT SHIFT NURSE FOR CONTINUITY OF CARE, POC DISCUSSED. PT IS RESTING 
IN SEMI FOWLERS POSITION, ON HIGH FLOW NASAL CANNULA AT 30 SATING AT 89%. PT CHEST RISING 
AND FALLING EVEN AND UNLABORED, NO S/S OF SOB. PT ON TELE MONITOR. PT REPORTS NEEDING TO 
SHOWER, EDUCATED PT ON NEEDING TO STAY IN ROOM AND KEEPING O2 ON. INFORMED PT ON BRINGING 
CLEANING SUPPLIES TO PT TO ASSIST WITH CLEANING. ALL COVID MEASURES IN PLACE, ALL SAFETY 
MEASURES IN PLACE. CALL LIGHT WITHIN REACH. WILL CONTINUE TO MONITOR.

## 2021-09-12 NOTE — NUR
Restoril was administered last night 2/2 c/o insomnia. He claims not to have slept well. 
Endorsed to AM RN to have the MD increase the sleep aide dose or something.

## 2021-09-12 NOTE — NUR
PT ENDORSED TO NIGHT SHIFT NURSE FOR CONTINUITY OF CARE, POC DISCUSSED ALL SAFETY MEASURES 
IN PLACE. CALL LIGHT WITHIN REACH. WILL CONTINUE TO MONITOR.

## 2021-09-12 NOTE — NUR
PT SHEETS HAS BEEN CHANGED, AND GOWN. 1 BM NOTED IN BEDSIDE COMMODE. PT EDUCATED ON 
INCENTIVE SPIROMETER, PT RETURN DEMONSTRATION. PT IS SATING AT 91%. ALL SAFETY MEASURES IN 
PLACE, CALL LIGHT WITHIN REACH. WILL CONTINUE TO MONITOR.

## 2021-09-12 NOTE — NUR
PT IN PRONE POSITION, SATING AT 96-98%. CHEST RISING AND FALLING EVEN AND UNLABORED. ALL 
SAFETY MEASURES IN PLACE, CALL LIGHT WITHIN REACH. WILL CONTINUE TO MONITOR.

## 2021-09-12 NOTE — NUR
NAKIA MEDICATION ADMINISTERED PER MD ORDER. PT IS SATING AT 91% ON 15L NC. ALL SAFETY  
MEASURES IN PLACE, CALL LIGHT WITHIN REACH. WILL CONTINUE TO MONITOR.

## 2021-09-13 VITALS — SYSTOLIC BLOOD PRESSURE: 98 MMHG | DIASTOLIC BLOOD PRESSURE: 59 MMHG

## 2021-09-13 VITALS — DIASTOLIC BLOOD PRESSURE: 72 MMHG | SYSTOLIC BLOOD PRESSURE: 118 MMHG

## 2021-09-13 VITALS — DIASTOLIC BLOOD PRESSURE: 69 MMHG | SYSTOLIC BLOOD PRESSURE: 117 MMHG

## 2021-09-13 VITALS — SYSTOLIC BLOOD PRESSURE: 118 MMHG | DIASTOLIC BLOOD PRESSURE: 70 MMHG

## 2021-09-13 VITALS — DIASTOLIC BLOOD PRESSURE: 62 MMHG | SYSTOLIC BLOOD PRESSURE: 111 MMHG

## 2021-09-13 LAB
ALBUMIN FLD-MCNC: 2.9 G/DL (ref 3.4–5)
ANION GAP SERPL CALCULATED.3IONS-SCNC: 15.4 MMOL/L (ref 8–16)
AST SERPL-CCNC: 22 U/L (ref 15–37)
BASOPHILS # BLD AUTO: 0 K/UL (ref 0–0.22)
BASOPHILS NFR BLD AUTO: 0 % (ref 0–2)
BILIRUB SERPL-MCNC: 0.4 MG/DL (ref 0–1)
BUN SERPL-MCNC: 16 MG/DL (ref 7–18)
CHLORIDE SERPL-SCNC: 106 MMOL/L (ref 98–107)
CO2 SERPL-SCNC: 22.8 MMOL/L (ref 21–32)
CREAT SERPL-MCNC: 0.7 MG/DL (ref 0.6–1.3)
EOSINOPHIL # BLD AUTO: 0 K/UL (ref 0–0.4)
EOSINOPHIL NFR BLD AUTO: 0.1 % (ref 0–4)
ERYTHROCYTE [DISTWIDTH] IN BLOOD BY AUTOMATED COUNT: 13.4 % (ref 11.6–13.7)
GFR SERPL CREATININE-BSD FRML MDRD: 159 ML/MIN (ref 90–?)
GLUCOSE SERPL-MCNC: 129 MG/DL (ref 74–106)
HCT VFR BLD AUTO: 41.7 % (ref 36–52)
HGB BLD-MCNC: 14 G/DL (ref 12–18)
LYMPHOCYTES # BLD AUTO: 0.7 K/UL (ref 2–11.5)
LYMPHOCYTES NFR BLD AUTO: 5 % (ref 20.5–51.1)
MCH RBC QN AUTO: 30 PG (ref 27–31)
MCHC RBC AUTO-ENTMCNC: 34 G/DL (ref 33–37)
MCV RBC AUTO: 88.7 FL (ref 80–94)
MONOCYTES # BLD AUTO: 0.6 K/UL (ref 0.8–1)
MONOCYTES NFR BLD AUTO: 4.1 % (ref 1.7–9.3)
NEUTROPHILS # BLD AUTO: 13.4 K/UL (ref 1.8–7.7)
NEUTROPHILS NFR BLD AUTO: 90.8 % (ref 42.2–75.2)
PLATELET # BLD AUTO: 413 K/UL (ref 140–450)
POTASSIUM SERPL-SCNC: 4.2 MMOL/L (ref 3.5–5.1)
RBC # BLD AUTO: 4.7 MIL/UL (ref 4.2–6.1)
SODIUM SERPL-SCNC: 140 MMOL/L (ref 136–145)
WBC # BLD AUTO: 14.7 K/UL (ref 4.8–10.8)

## 2021-09-13 RX ADMIN — APIXABAN SCH MG: 2.5 TABLET, FILM COATED ORAL at 08:29

## 2021-09-13 RX ADMIN — Medication SCH IU: at 08:26

## 2021-09-13 RX ADMIN — OXYCODONE HYDROCHLORIDE AND ACETAMINOPHEN SCH MG: 500 TABLET ORAL at 08:26

## 2021-09-13 RX ADMIN — TEMAZEPAM SCH MG: 15 CAPSULE ORAL at 23:40

## 2021-09-13 RX ADMIN — MULTIVITAMIN TABLET SCH TAB: TABLET at 08:26

## 2021-09-13 RX ADMIN — DEXAMETHASONE SODIUM PHOSPHATE SCH MG: 4 INJECTION, SOLUTION INTRAMUSCULAR; INTRAVENOUS at 16:47

## 2021-09-13 NOTE — NUR
RECEIVED REPORT FROM NURSE IN BED, ALERT ORIENTED X 4, SINUS RHYTHM, REGULAR DIET, IV ACCESS 
ON L FOREARM. SKIN IS INTACT. ON CURAPLEX 15 L OXYGEN, O2 SAT IS 95%. PT DENIES PAIN AND 
DISCOMFORT. POC DISCUSSED WILL CONTINUE TO MONITOR PT. DISCUSSED WITH PATIENT MEDICATION 
REGIMEN, FALL AND SAFETY PRECAUTIONS AND MEDICAL PLAN OF CARE. NO ACUTE DISTRESS NOTED. RN 
WILL CONTINUE WITH SKILLED MEDICAL CARE.

## 2021-09-13 NOTE — NUR
PT IS IN THE BED TALKING OVER THE PHONE WITH FAMILY. PT ASKED QUESTIONS ABOUT HIS 
DISCHARGED. EDUCATED PT ABOUT IS OXYGEN FLOW AND O2 SAT. PT VERBALIZED THE UNDERSTANDING OF 
IMPORTANCE OF HIS STAY IN HOSPITAL. WILL CONTINUE TO ASSESS THE PT.

## 2021-09-13 NOTE — NUR
RECEIVED REPORT FROM NIGHT NURSE, PT IS ALERT ORIENTED X 4, SINUS RHYTHM, REGULAR DIET, IV 
ACCESS ON L FOREARM. SKIN IS INTACT. ON CURAPLEX 15 L OXYGEN, O2 SAT IS 94%. PT DENIES PAIN 
AND DISCOMFORT. POC DISCUSSED WILL CONTINUE TO MONITOR PT.

## 2021-09-13 NOTE — NUR
ADMINISTERED ALL PRESCRIBED MEDICATIONS PER MD ORDER. PT WAS ANXIOUS ON MORNING ASSESSMENT. 
ATIVAN GIVEN PER MD ORDER. WILL CONTINUE TO MONITOR PT.

## 2021-09-14 VITALS — SYSTOLIC BLOOD PRESSURE: 116 MMHG | DIASTOLIC BLOOD PRESSURE: 62 MMHG

## 2021-09-14 VITALS — SYSTOLIC BLOOD PRESSURE: 110 MMHG | DIASTOLIC BLOOD PRESSURE: 68 MMHG

## 2021-09-14 VITALS — SYSTOLIC BLOOD PRESSURE: 113 MMHG | DIASTOLIC BLOOD PRESSURE: 61 MMHG

## 2021-09-14 VITALS — DIASTOLIC BLOOD PRESSURE: 72 MMHG | SYSTOLIC BLOOD PRESSURE: 118 MMHG

## 2021-09-14 LAB
ALBUMIN FLD-MCNC: 2.8 G/DL (ref 3.4–5)
ANION GAP SERPL CALCULATED.3IONS-SCNC: 11.6 MMOL/L (ref 8–16)
AST SERPL-CCNC: 19 U/L (ref 15–37)
BASOPHILS # BLD AUTO: 0 K/UL (ref 0–0.22)
BASOPHILS NFR BLD AUTO: 0.1 % (ref 0–2)
BILIRUB SERPL-MCNC: 0.2 MG/DL (ref 0–1)
BUN SERPL-MCNC: 20 MG/DL (ref 7–18)
CHLORIDE SERPL-SCNC: 106 MMOL/L (ref 98–107)
CO2 SERPL-SCNC: 26.5 MMOL/L (ref 21–32)
CREAT SERPL-MCNC: 0.8 MG/DL (ref 0.6–1.3)
EOSINOPHIL # BLD AUTO: 0 K/UL (ref 0–0.4)
EOSINOPHIL NFR BLD AUTO: 0 % (ref 0–4)
ERYTHROCYTE [DISTWIDTH] IN BLOOD BY AUTOMATED COUNT: 13.3 % (ref 11.6–13.7)
GFR SERPL CREATININE-BSD FRML MDRD: 136 ML/MIN (ref 90–?)
GLUCOSE SERPL-MCNC: 116 MG/DL (ref 74–106)
HCT VFR BLD AUTO: 40.2 % (ref 36–52)
HGB BLD-MCNC: 13.7 G/DL (ref 12–18)
LYMPHOCYTES # BLD AUTO: 1 K/UL (ref 2–11.5)
LYMPHOCYTES NFR BLD AUTO: 5.4 % (ref 20.5–51.1)
MCH RBC QN AUTO: 30 PG (ref 27–31)
MCHC RBC AUTO-ENTMCNC: 34 G/DL (ref 33–37)
MCV RBC AUTO: 87.4 FL (ref 80–94)
MONOCYTES # BLD AUTO: 1.1 K/UL (ref 0.8–1)
MONOCYTES NFR BLD AUTO: 5.8 % (ref 1.7–9.3)
NEUTROPHILS # BLD AUTO: 16.2 K/UL (ref 1.8–7.7)
NEUTROPHILS NFR BLD AUTO: 88.7 % (ref 42.2–75.2)
PLATELET # BLD AUTO: 401 K/UL (ref 140–450)
POTASSIUM SERPL-SCNC: 4.1 MMOL/L (ref 3.5–5.1)
RBC # BLD AUTO: 4.6 MIL/UL (ref 4.2–6.1)
SODIUM SERPL-SCNC: 140 MMOL/L (ref 136–145)
WBC # BLD AUTO: 18.3 K/UL (ref 4.8–10.8)

## 2021-09-14 RX ADMIN — OXYCODONE HYDROCHLORIDE AND ACETAMINOPHEN SCH MG: 500 TABLET ORAL at 08:55

## 2021-09-14 RX ADMIN — Medication SCH IU: at 08:54

## 2021-09-14 RX ADMIN — MULTIVITAMIN TABLET SCH TAB: TABLET at 08:55

## 2021-09-14 NOTE — NUR
PT IS IN THE BED ON THE PHONE WITH FAMILY. PT ASKED QUESTIONS ABOUT HIS DISCHARGED. EDUCATED 
PT ABOUT IS OXYGEN FLOW AND O2 SAT. PT VERBALIZED THE UNDERSTANDING OF IMPORTANCE OF HIS 
STAY IN HOSPITAL. WILL CONTINUE TO ASSESS THE PT. NO ACUTE DISTRESS NOTED.

## 2021-09-14 NOTE — NUR
PT LEFT AMA, EDUCATED PT ABOUT HIGH RISK OF HYPOXIA AND RISK OF EXPOSING FAMILY MEMBERS TO 
COVID. PT VERBALIZED THE UNDERSTANDING OF DISEASE AND RISK OF CONTAMINATION, AND PREFER TO 
LEAVE AMA. DC THE IV, GAVE ALL BELONGING TO THE PT, MASK PROVIDED, AND WALKED PT TO THE 
FRONT LOBBY.

## 2021-09-14 NOTE — NUR
RECEIVED REPORT FROM NIGHT NURSE PT IS ALERT ORIENTED X 4, LAYING IN THE BED, NO SOB NOTED. 
PT IS ON 15 L OXYGEN. DROPLET ISOLATION DUE TO COVID. PT HAS IV ACCESS ON LEFT HAND, 22 
GAUGE. POC DISCUSSED WILL CONTINUE TO MONITOR PT.

## 2021-09-14 NOTE — NUR
PT WANTS TO LEAVE AMA, HE IS ON HIGH FLOW OXYGEN, COVID POSITIVE, EDUCATED PT ABOUT RISK VS. 
BENEFITS OF LEAVING THE HOSPITAL. PT WANTS TO TALK TO DOCTOR. MADE DOCTOR AWARE OF PT'S 
DESIRE TO LEAVE THE HOSPITAL.

## 2021-09-14 NOTE — NUR
ADMINISTERED ALL PRESCRIBED MEDICATIONS PER MD ORDER. PT WAS ANXIOUS ON ASSESSMENT. ATIVAN 
GIVEN PER MD ORDER. WILL CONTINUE TO MONITOR PT. PATIENT SLEEPING COMFORTABLY WITH NO 
DISTRESS NOTED.